# Patient Record
Sex: FEMALE | Race: WHITE
[De-identification: names, ages, dates, MRNs, and addresses within clinical notes are randomized per-mention and may not be internally consistent; named-entity substitution may affect disease eponyms.]

---

## 2021-02-04 ENCOUNTER — HOSPITAL ENCOUNTER (OUTPATIENT)
Dept: HOSPITAL 53 - M PLALAB | Age: 26
End: 2021-02-04
Attending: OBSTETRICS & GYNECOLOGY
Payer: COMMERCIAL

## 2021-02-04 DIAGNOSIS — Z34.81: Primary | ICD-10-CM

## 2021-02-04 LAB
CHLAMYDIA DNA AMPLIFICATION: NEGATIVE
HCT VFR BLD AUTO: 39.6 % (ref 36–47)
HCV AB SER QL: < 0 INDEX (ref ?–0.8)
HGB BLD-MCNC: 13.6 G/DL (ref 12–15.5)
HIV 1+2 AB+HIV1 P24 AG SERPL QL IA: NEGATIVE
MCH RBC QN AUTO: 32.9 PG (ref 27–33)
MCHC RBC AUTO-ENTMCNC: 34.3 G/DL (ref 32–36.5)
MCV RBC AUTO: 95.9 FL (ref 80–96)
N GONORRHOEA RRNA SPEC QL NAA+PROBE: NEGATIVE
PLATELET # BLD AUTO: 195 10^3/UL (ref 150–450)
RBC # BLD AUTO: 4.13 10^6/UL (ref 4–5.4)
WBC # BLD AUTO: 8.3 10^3/UL (ref 4–10)

## 2021-03-26 ENCOUNTER — HOSPITAL ENCOUNTER (OUTPATIENT)
Dept: HOSPITAL 53 - M WHC | Age: 26
End: 2021-03-26
Attending: ADVANCED PRACTICE MIDWIFE
Payer: COMMERCIAL

## 2021-03-26 DIAGNOSIS — Z34.02: Primary | ICD-10-CM

## 2021-03-26 DIAGNOSIS — Z3A.20: ICD-10-CM

## 2021-03-26 NOTE — REP
INDICATION:

ANATOMY.



COMPARISON:

None.



TECHNIQUE:

Multiple sonographic images of the gravid uterus.



FINDINGS:

There is a single intrauterine gestation.  Fetal position is variable.

The placenta is posterior with grade 1 maturity.  There is no placenta previa.

The umbilical cord inserts centrally onto the placenta.  There is a three-vessel cord.

Cervix measures 3.1 cm.



Fetal heart rate is 155 beats per minute.

Subjectively the amniotic fluid volume is normal.



The composite ultrasound gestational age today is 20 weeks 2 days with an LAURENT of

08/11/2021.

The LMP is unknown.

Estimated fetal weight is 347 g/  0 lb, 12 oz.

This is the 16th percentile for 21 weeks 0 days.



The following fetal anatomic structures are identified and are unremarkable:

Cranium, cavum septum pellucidum, falx, intracranial ventricles, cerebellum, cisterna

magna, nuchal fold, facial profile, upper lip, cardiac rhythm, four-chamber heart,

cardiac right left ventricular outflow tracts, diaphragm, stomach, abdominal wall,

right and left kidneys, bladder, spine, upper lower extremities, upper lower right and

left upper extremities, right left lower extremities, 3 vessel cord.



The right choroid plexus contains 2-3 cysts measuring up to 4 mm in diameter.



IMPRESSION:

There are 2 or 3 small right choroid plexus cysts measuring up to 4 mm.

Otherwise, there are no fetal anomalies.





<Electronically signed by Sloan Slaughter > 03/26/21 [id number]

## 2021-05-06 ENCOUNTER — HOSPITAL ENCOUNTER (OUTPATIENT)
Dept: HOSPITAL 53 - M PLALAB | Age: 26
End: 2021-05-06
Attending: ADVANCED PRACTICE MIDWIFE
Payer: COMMERCIAL

## 2021-05-06 DIAGNOSIS — Z36.89: Primary | ICD-10-CM

## 2021-05-06 DIAGNOSIS — Z34.02: ICD-10-CM

## 2021-05-06 LAB
HCT VFR BLD AUTO: 38.2 % (ref 36–47)
HGB BLD-MCNC: 12.9 G/DL (ref 12–15.5)
MCH RBC QN AUTO: 33.5 PG (ref 27–33)
MCHC RBC AUTO-ENTMCNC: 33.8 G/DL (ref 32–36.5)
MCV RBC AUTO: 99.2 FL (ref 80–96)
PLATELET # BLD AUTO: 166 10^3/UL (ref 150–450)
RBC # BLD AUTO: 3.85 10^6/UL (ref 4–5.4)
WBC # BLD AUTO: 9 10^3/UL (ref 4–10)

## 2021-08-14 ENCOUNTER — HOSPITAL ENCOUNTER (INPATIENT)
Dept: HOSPITAL 53 - M LDI | Age: 26
LOS: 3 days | Discharge: HOME | DRG: 560 | End: 2021-08-17
Attending: OBSTETRICS & GYNECOLOGY | Admitting: OBSTETRICS & GYNECOLOGY
Payer: COMMERCIAL

## 2021-08-14 VITALS — SYSTOLIC BLOOD PRESSURE: 128 MMHG | DIASTOLIC BLOOD PRESSURE: 86 MMHG

## 2021-08-14 VITALS — DIASTOLIC BLOOD PRESSURE: 87 MMHG | SYSTOLIC BLOOD PRESSURE: 132 MMHG

## 2021-08-14 VITALS — SYSTOLIC BLOOD PRESSURE: 120 MMHG | DIASTOLIC BLOOD PRESSURE: 76 MMHG

## 2021-08-14 VITALS — DIASTOLIC BLOOD PRESSURE: 76 MMHG | SYSTOLIC BLOOD PRESSURE: 120 MMHG

## 2021-08-14 VITALS — SYSTOLIC BLOOD PRESSURE: 133 MMHG | DIASTOLIC BLOOD PRESSURE: 78 MMHG

## 2021-08-14 VITALS — DIASTOLIC BLOOD PRESSURE: 82 MMHG | SYSTOLIC BLOOD PRESSURE: 141 MMHG

## 2021-08-14 VITALS — DIASTOLIC BLOOD PRESSURE: 82 MMHG | SYSTOLIC BLOOD PRESSURE: 127 MMHG

## 2021-08-14 VITALS — WEIGHT: 171.74 LBS | HEIGHT: 67 IN | BODY MASS INDEX: 26.96 KG/M2

## 2021-08-14 VITALS — SYSTOLIC BLOOD PRESSURE: 134 MMHG | DIASTOLIC BLOOD PRESSURE: 81 MMHG

## 2021-08-14 VITALS — SYSTOLIC BLOOD PRESSURE: 120 MMHG | DIASTOLIC BLOOD PRESSURE: 90 MMHG

## 2021-08-14 VITALS — SYSTOLIC BLOOD PRESSURE: 119 MMHG | DIASTOLIC BLOOD PRESSURE: 75 MMHG

## 2021-08-14 VITALS — DIASTOLIC BLOOD PRESSURE: 76 MMHG | SYSTOLIC BLOOD PRESSURE: 123 MMHG

## 2021-08-14 VITALS — SYSTOLIC BLOOD PRESSURE: 126 MMHG | DIASTOLIC BLOOD PRESSURE: 81 MMHG

## 2021-08-14 VITALS — DIASTOLIC BLOOD PRESSURE: 83 MMHG | SYSTOLIC BLOOD PRESSURE: 132 MMHG

## 2021-08-14 DIAGNOSIS — Z3A.41: ICD-10-CM

## 2021-08-14 DIAGNOSIS — O48.0: Primary | ICD-10-CM

## 2021-08-14 LAB
HCT VFR BLD AUTO: 41.5 % (ref 36–47)
HGB BLD-MCNC: 14.3 G/DL (ref 12–15.5)
MCH RBC QN AUTO: 33 PG (ref 27–33)
MCHC RBC AUTO-ENTMCNC: 34.5 G/DL (ref 32–36.5)
MCV RBC AUTO: 95.8 FL (ref 80–96)
PLATELET # BLD AUTO: 164 10^3/UL (ref 150–450)
RBC # BLD AUTO: 4.33 10^6/UL (ref 4–5.4)
WBC # BLD AUTO: 9.2 10^3/UL (ref 4–10)

## 2021-08-14 PROCEDURE — 3E0P7GC INTRODUCTION OF OTHER THERAPEUTIC SUBSTANCE INTO FEMALE REPRODUCTIVE, VIA NATURAL OR ARTIFICIAL OPENING: ICD-10-PCS | Performed by: OBSTETRICS & GYNECOLOGY

## 2021-08-14 RX ADMIN — MISOPROSTOL SCH MCG: 100 TABLET ORAL at 17:26

## 2021-08-14 RX ADMIN — MISOPROSTOL SCH MCG: 100 TABLET ORAL at 22:00

## 2021-08-14 RX ADMIN — MISOPROSTOL SCH MCG: 100 TABLET ORAL at 09:10

## 2021-08-14 RX ADMIN — MISOPROSTOL SCH MCG: 100 TABLET ORAL at 13:10

## 2021-08-14 NOTE — HPEPDOC
Obstetrical History & Physical


General


Date of Admission


Aug 14, 2021 at 07:34





History of Present Illness


27yo G1 at 41w1d presents for IOL. EDC 21, patient with uncomplicated and ap

propriate prenatal care.


Chief Complaint:  Induction of labor


Information Provided By:  Patient


Age:  26


:  1





Prenatal Care


Prenatal Care:  [facility]





Prenatal Dating


Final EDC:  Aug 6, 2021


Final EDC by:  1st trimester (US)


EGA at Admission:  41





Past Medical History


Past Obstetrical History :  


   Past Obstetrical History:  Primgravida


Past Medical History


Surgical History:  Denies/None





Family History


Significant Family History:  No pertinent family hx





Social History


Marital Status:  


Psychosocial History:  No pertinent psych hx


* Smoker:  non-smoker


Alcohol:  Denies


Drugs:  denies





Allergies


Coded Allergies:  


     No Known Allergies (Unverified , 21)





Medications


Scheduled


Prenatal No.137/Iron/Folic Acd (Prenatal Vitamin Tablet) 1 Each Tablet, 1 TAB PO

DAILY





Physical Examination


Physical Examination


GENERAL: Alert and oriented times three.


BREAST: .


ABDOMEN: Gravid and non-tender to touch.


FETUS: Is vertex (VTX) by sterile vaginal examination (SVE), fetus is vertex 

(VTX) by Leopold.


HEART RATE: Regular rate and rhythm.


LUNGS: Clear to auscultation (CTA).





Vital Signs/I&O





Vital Signs








  Date Time  Temp Pulse Resp B/P (MAP) Pulse Ox O2 Delivery O2 Flow Rate FiO2


 


21 08:05 97.9 67 18 133/78 (96)    











Laboratory Data


24H LABS


Laboratory Tests 2


21 07:47: Serology Scanned Report Hepatitis B Testing





Pertinent Laboratoy Data


Blood Type:  A+


RBC Antibody Screen:  Negative


HIV:  Negative


Hepatitis B:  Negative


Hepatitis C:  Negative


Rapid Plasma Reagin:  Nonreactive


Rubella:  Immune


Chlamydia/Gonorrhea:  Negative


Group B Streptococcus:  Negative





Anatomy Ultrasound


Placenta Location:  Posterior


Placenta Previa:  No





Vaginal Examination


Dilation:  Fingertip


Station:  -2


Cervical Consistency:  Medium


Cervical Position:  Posterior


Fetal Presentation:  Cephalic presentation





Fetal Assessment


Variability:  Moderate





Tocometer


Contractions:  Yes


Frequency:  irregular





Assessment/Plan


Assessment


26-year-old  1 at 41 weeks 1 day for induction of labor


Reassuring fetal status





Plan


Admit and orient.


 and consent.


Diet: Regular.


Group B Streptococcus (GBS) negative.


Labs and intravenous (IV) per unit protocol.


Counseled on Pitocin and induction of labor (IOL).


Anticipate normal spontaneous delivery ().


C-S as appropriate.











DELON OTOOLE MD.                 Aug 14, 2021 09:51

## 2021-08-15 VITALS — SYSTOLIC BLOOD PRESSURE: 161 MMHG | DIASTOLIC BLOOD PRESSURE: 88 MMHG

## 2021-08-15 VITALS — DIASTOLIC BLOOD PRESSURE: 91 MMHG | SYSTOLIC BLOOD PRESSURE: 132 MMHG

## 2021-08-15 VITALS — SYSTOLIC BLOOD PRESSURE: 140 MMHG | DIASTOLIC BLOOD PRESSURE: 98 MMHG

## 2021-08-15 VITALS — SYSTOLIC BLOOD PRESSURE: 131 MMHG | DIASTOLIC BLOOD PRESSURE: 94 MMHG

## 2021-08-15 VITALS — SYSTOLIC BLOOD PRESSURE: 131 MMHG | DIASTOLIC BLOOD PRESSURE: 82 MMHG

## 2021-08-15 VITALS — SYSTOLIC BLOOD PRESSURE: 140 MMHG | DIASTOLIC BLOOD PRESSURE: 90 MMHG

## 2021-08-15 VITALS — SYSTOLIC BLOOD PRESSURE: 142 MMHG | DIASTOLIC BLOOD PRESSURE: 81 MMHG

## 2021-08-15 VITALS — SYSTOLIC BLOOD PRESSURE: 131 MMHG | DIASTOLIC BLOOD PRESSURE: 90 MMHG

## 2021-08-15 VITALS — DIASTOLIC BLOOD PRESSURE: 88 MMHG | SYSTOLIC BLOOD PRESSURE: 171 MMHG

## 2021-08-15 VITALS — SYSTOLIC BLOOD PRESSURE: 139 MMHG | DIASTOLIC BLOOD PRESSURE: 95 MMHG

## 2021-08-15 VITALS — DIASTOLIC BLOOD PRESSURE: 69 MMHG | SYSTOLIC BLOOD PRESSURE: 125 MMHG

## 2021-08-15 VITALS — DIASTOLIC BLOOD PRESSURE: 71 MMHG | SYSTOLIC BLOOD PRESSURE: 134 MMHG

## 2021-08-15 VITALS — DIASTOLIC BLOOD PRESSURE: 79 MMHG | SYSTOLIC BLOOD PRESSURE: 125 MMHG

## 2021-08-15 VITALS — DIASTOLIC BLOOD PRESSURE: 87 MMHG | SYSTOLIC BLOOD PRESSURE: 156 MMHG

## 2021-08-15 VITALS — DIASTOLIC BLOOD PRESSURE: 74 MMHG | SYSTOLIC BLOOD PRESSURE: 116 MMHG

## 2021-08-15 PROCEDURE — 0KQM0ZZ REPAIR PERINEUM MUSCLE, OPEN APPROACH: ICD-10-PCS | Performed by: OBSTETRICS & GYNECOLOGY

## 2021-08-15 RX ADMIN — Medication SCH TAB: at 13:38

## 2021-08-15 RX ADMIN — DOCUSATE SODIUM SCH MG: 100 CAPSULE, LIQUID FILLED ORAL at 20:30

## 2021-08-15 RX ADMIN — IBUPROFEN PRN MG: 800 TABLET, FILM COATED ORAL at 13:39

## 2021-08-15 RX ADMIN — MISOPROSTOL SCH MCG: 100 TABLET ORAL at 01:57

## 2021-08-15 RX ADMIN — DOCUSATE SODIUM SCH MG: 100 CAPSULE, LIQUID FILLED ORAL at 13:38

## 2021-08-15 NOTE — DNPDOC
[facility] Delivery Note


Delivery Note


DATE OF DELIVERY: August 15, 2021





TIME OF BIRTH: 0955





GENDER: Female





APGARS: 9 and 9.





WEIGHT: 3450 over 7 pounds 10 ounces





LACERATIONS: 2MLL





ANESTHESIA: None





ESTIMATED BLOOD LOSS: 400 ml





COUNTS: 5 laparotomy sponges accounted for prior to after delivery.   3 sharps 

removed from delivery field.


   


DELIVERY NOTE: On August 15, 2021 at 0955 Ms. Bejarano is a 26-year-old  1

now para 1 who had a spontaneous vaginal delivery of a liveborn female infant 

Apgars 1930 450 g or 7 pounds 10 ounces. Head was delivered occiput anterior

(OA).  There is a nuchal cord which was manually reduced, followed by delivery 

of the shoulders and corpus.  Infant was handed to mom with a good cry.  Cord 

was clamped times two and was cut by support person under my direction.  

Placenta was then drained and delivered grossly intact.  A premixed bag of 500 

mL of normal saline with 30 units of Pitocin was then bolused along with uterine

massage until the uterus was firm.  On inspection there was a 2MLL that was 

repaired with 3-0 Vicryl after infusion with 1% lidocaine. On reinspection, 

cervix, vagina, perineum was grossly intact and hemostatic.  Mom and baby in 

recovery on stable condition.











DELON OTOOLE MD.                 Aug 15, 2021 10:39

## 2021-08-16 VITALS — SYSTOLIC BLOOD PRESSURE: 132 MMHG | DIASTOLIC BLOOD PRESSURE: 77 MMHG

## 2021-08-16 VITALS — DIASTOLIC BLOOD PRESSURE: 61 MMHG | SYSTOLIC BLOOD PRESSURE: 102 MMHG

## 2021-08-16 RX ADMIN — IBUPROFEN PRN MG: 800 TABLET, FILM COATED ORAL at 19:05

## 2021-08-16 RX ADMIN — DOCUSATE SODIUM SCH MG: 100 CAPSULE, LIQUID FILLED ORAL at 22:37

## 2021-08-16 RX ADMIN — Medication SCH TAB: at 08:04

## 2021-08-16 RX ADMIN — IBUPROFEN PRN MG: 600 TABLET, FILM COATED ORAL at 00:57

## 2021-08-16 RX ADMIN — DOCUSATE SODIUM SCH MG: 100 CAPSULE, LIQUID FILLED ORAL at 08:04

## 2021-08-16 RX ADMIN — IBUPROFEN PRN MG: 800 TABLET, FILM COATED ORAL at 08:05

## 2021-08-16 NOTE — IPNPDOC
Postpartum Progress Note


Date of Service:  Aug 16, 2021


Postpartum Day#:  1


Postpartum Progress Note


SUBJECT: Doing well without complaints. Ambulating, voiding and pain is well-c

ontrolled. Reports minimal lochia. 





OBJECTIVE: 


VITAL SIGNS: Within normal limits, afebrile.


Alert and oriented times three.


Abdomen: Fundus firm at U-2. Soft, NTTP.


Ext: neg calf tenderness.


ASSESSMENT: Postpartum day #1 status post . Recovering in stable condition.





PLAN:


1. Continue routine postpartum care


2. Discharge plans for tomorrow





VS, I&O, 24H, Fishbone


Vital Signs/I&O





Vital Signs








  Date Time  Temp Pulse Resp B/P (MAP) Pulse Ox O2 Delivery O2 Flow Rate FiO2


 


21 06:00 98.5 90 16 102/61 (75) 98 Room Air  














I&O- Last 24 Hours up to 6 AM 


 


 21





 05:59


 


Intake Total 521 ml


 


Output Total 1150 ml


 


Balance -629 ml

















DELON OTOOLE MD.                 Aug 16, 2021 07:32

## 2021-08-17 VITALS — SYSTOLIC BLOOD PRESSURE: 139 MMHG | DIASTOLIC BLOOD PRESSURE: 86 MMHG

## 2021-08-17 RX ADMIN — Medication SCH TAB: at 08:14

## 2021-08-17 RX ADMIN — IBUPROFEN PRN MG: 600 TABLET, FILM COATED ORAL at 08:15

## 2021-08-17 RX ADMIN — DOCUSATE SODIUM SCH MG: 100 CAPSULE, LIQUID FILLED ORAL at 08:14

## 2022-04-27 ENCOUNTER — HOSPITAL ENCOUNTER (OUTPATIENT)
Dept: HOSPITAL 53 - M SFHCWAGY | Age: 27
End: 2022-04-27
Attending: OBSTETRICS & GYNECOLOGY
Payer: COMMERCIAL

## 2022-04-27 DIAGNOSIS — Z01.419: Primary | ICD-10-CM

## 2023-10-12 ENCOUNTER — HOSPITAL ENCOUNTER (OUTPATIENT)
Dept: HOSPITAL 53 - M SFHCWAGY | Age: 28
End: 2023-10-12
Attending: OBSTETRICS & GYNECOLOGY
Payer: COMMERCIAL

## 2023-10-12 DIAGNOSIS — Z12.4: ICD-10-CM

## 2023-10-12 DIAGNOSIS — Z01.419: Primary | ICD-10-CM

## 2023-10-12 DIAGNOSIS — Z77.9: ICD-10-CM
